# Patient Record
Sex: FEMALE | Race: WHITE
[De-identification: names, ages, dates, MRNs, and addresses within clinical notes are randomized per-mention and may not be internally consistent; named-entity substitution may affect disease eponyms.]

---

## 2021-03-22 ENCOUNTER — HOSPITAL ENCOUNTER (OUTPATIENT)
Dept: HOSPITAL 75 - RAD | Age: 26
End: 2021-03-22
Attending: INTERNAL MEDICINE
Payer: MEDICAID

## 2021-03-22 DIAGNOSIS — J32.0: Primary | ICD-10-CM

## 2021-03-22 DIAGNOSIS — R42: ICD-10-CM

## 2021-03-22 DIAGNOSIS — R55: ICD-10-CM

## 2021-03-22 PROCEDURE — 70551 MRI BRAIN STEM W/O DYE: CPT

## 2021-03-22 NOTE — DIAGNOSTIC IMAGING REPORT
CLINICAL INDICATIONS: Patient with dizziness spells, headaches

and syncope.



EXAM: MRI of the brain performed without IV contrast. Sequences

include axial DWI, ADC map, axial T2, axial FLAIR, axial T1,

axial gradient echo, and sagittal T1.



COMPARISON: Head CT without contrast dated 06/02/2014..



FINDINGS:



There is no evidence of acute cerebral infarct, intracranial

hemorrhage, or gross mass effect. 



The brain parenchymal volume appears appropriate for patient's

age. There is normal gray-white matter distinction.  There is no

significant midline shift or herniation. 



The [place] vascular structures show no gross

abnormality as visualized. The pituitary gland, sella, and

suprasellar regions are unremarkable as visualized. There is no

evidence of hydrocephalus. The basal cisterns are unremarkable.

The skull, extracranial soft tissue, and orbits are unremarkable.

There is minimal mucosal thickening involving the right maxillary

sinus. Temporal bones show no significant abnormality.



IMPRESSION:

Minimal right maxillary sinus disease. Otherwise, unremarkable

MRI of the brain.



Dictated by: 



  Dictated on workstation # [id number]

## 2021-07-12 ENCOUNTER — HOSPITAL ENCOUNTER (OUTPATIENT)
Dept: HOSPITAL 75 - RAD | Age: 26
End: 2021-07-12
Attending: INTERNAL MEDICINE
Payer: COMMERCIAL

## 2021-07-12 DIAGNOSIS — G93.2: Primary | ICD-10-CM

## 2021-07-12 DIAGNOSIS — J32.9: ICD-10-CM

## 2021-07-12 PROCEDURE — 70450 CT HEAD/BRAIN W/O DYE: CPT

## 2021-07-12 NOTE — DIAGNOSTIC IMAGING REPORT
PROCEDURE: 

CT head without contrast.



TECHNIQUE: 

Multiple contiguous axial images were obtained through the brain

without the use of intravenous contrast. Auto Exposure Controls

were utilized during the CT exam to meet ALARA standards for

radiation dose reduction. 



INDICATION:  

Head pain. 



COMPARISON:  

06/02/2014. 



FINDINGS: 

The brain appears normal. There is no hemorrhage, hydrocephalus,

edema, mass, mass effect, or evidence for an elevation of the

intracerebral pressures. There is membrane thickening as well as

air-fluid levels in the bilateral maxillary sinuses as well as

membrane disease and opacification of multiple bilateral ethmoid

air cells. The frontal sinuses are clear. The sphenoid sinuses

show trace membrane thickening. The mastoids are clear. The

middle ear cavities are normal. The orbital contents are

unremarkable.



IMPRESSION:

1. Findings of acute on chronic paranasal sinusitis.

2. Normal appearance of the brain.



Dictated by: 



  Dictated on workstation # [id number]

## 2021-07-19 ENCOUNTER — HOSPITAL ENCOUNTER (OUTPATIENT)
Dept: HOSPITAL 75 - SDC | Age: 26
Discharge: HOME | End: 2021-07-19
Attending: ANESTHESIOLOGY
Payer: COMMERCIAL

## 2021-07-19 VITALS — SYSTOLIC BLOOD PRESSURE: 116 MMHG | DIASTOLIC BLOOD PRESSURE: 79 MMHG

## 2021-07-19 DIAGNOSIS — G93.2: Primary | ICD-10-CM

## 2021-07-19 LAB
APPEARANCE CSF: CLEAR
BASOPHILS # BLD AUTO: 0.1 10^3/UL (ref 0–0.1)
BASOPHILS NFR BLD AUTO: 1 % (ref 0–10)
COLOR CSF: COLORLESS
EOSINOPHIL # BLD AUTO: 0.1 10^3/UL (ref 0–0.3)
EOSINOPHIL NFR BLD AUTO: 2 % (ref 0–10)
GLUCOSE CSF-MCNC: 59 MG/DL (ref 50–80)
HCT VFR BLD CALC: 38 % (ref 35–52)
HGB BLD-MCNC: 12.6 G/DL (ref 11.5–16)
LYMPHOCYTES # BLD AUTO: 1.8 10^3/UL (ref 1–4)
LYMPHOCYTES NFR BLD AUTO: 26 % (ref 12–44)
LYMPHOCYTES NFR CSF MANUAL: (no result) %
MANUAL DIFFERENTIAL PERFORMED BLD QL: NO
MCH RBC QN AUTO: 29 PG (ref 25–34)
MCHC RBC AUTO-ENTMCNC: 33 G/DL (ref 32–36)
MCV RBC AUTO: 87 FL (ref 80–99)
MONOCYTES # BLD AUTO: 0.6 10^3/UL (ref 0–1)
MONOCYTES NFR BLD AUTO: 8 % (ref 0–12)
NEUTROPHILS # BLD AUTO: 4.2 10^3/UL (ref 1.8–7.8)
NEUTROPHILS NFR BLD AUTO: 62 % (ref 42–75)
PLATELET # BLD: 344 10^3/UL (ref 130–400)
PMV BLD AUTO: 9.6 FL (ref 9–12.2)
PROT CSF-MCNC: 20 MG/DL (ref 15–40)
TUBE # CSF: 4
WBC # BLD AUTO: 6.8 10^3/UL (ref 4.3–11)

## 2021-07-19 PROCEDURE — 82945 GLUCOSE OTHER FLUID: CPT

## 2021-07-19 PROCEDURE — 84157 ASSAY OF PROTEIN OTHER: CPT

## 2021-07-19 PROCEDURE — 85025 COMPLETE CBC W/AUTO DIFF WBC: CPT

## 2021-07-19 PROCEDURE — 87205 SMEAR GRAM STAIN: CPT

## 2021-07-19 PROCEDURE — 89051 BODY FLUID CELL COUNT: CPT

## 2021-07-19 PROCEDURE — 36415 COLL VENOUS BLD VENIPUNCTURE: CPT

## 2021-07-19 PROCEDURE — 87070 CULTURE OTHR SPECIMN AEROBIC: CPT

## 2021-07-19 NOTE — ANESTHESIA-PROCEDURE NOTE
Procedures/Interventions


Procedure Start/Stop/Diagnosis


Date of Procedure:  Jul 19, 2021


Start Time:  10:06


Referring Physician:  Dr Rosalina Grullon


Brief History


Patient was admitted as an outpatient for a lumbar puncture. CT of head negative

from last week and platelet count WNL. Orders received for opening pressure and 

labs for CSF per Dr Rosalina Grullon from [facility]. Procedure and risks explained to the

patient and consent signed. Patient in left lateral position, sterile prepped 

and draped. Localized with 3cc 1% Lido at L4-L5. 25g Pencan 3.5 inch needle used

to locate CSF, which took approximately 10 minutes, redirecting as needed due to

patient discomfort. CSF opening pressure was 14.5 cm H2O and it was noted to be 

clear. 2.5-3 cc of CSF obtained in each of 4 vials over approximately 20 

minutes. Needles removed and Bandaid applied covering site. Patient was 

repositioned supine and made comfortable. Drink was provided. Reported off to 

GARTH Monreal with discharge instructions.


Stop Time:  10:35





Lumbar Puncture


Discussed Risk,Benefits:  Yes


Patient Consents:  Yes


Position:  L4-5, Left


Sterile Technique:  Yes


Opening Pressure:  14 cm H2O


Fluid Color:  clear


Spinal Needle Used:  Other (25 g Pencan 3 1/2 inch)











ELIZABETH RICHARDSON CRNA         Jul 19, 2021 13:52